# Patient Record
Sex: MALE | Race: BLACK OR AFRICAN AMERICAN | NOT HISPANIC OR LATINO | URBAN - METROPOLITAN AREA
[De-identification: names, ages, dates, MRNs, and addresses within clinical notes are randomized per-mention and may not be internally consistent; named-entity substitution may affect disease eponyms.]

---

## 2020-08-05 ENCOUNTER — EMERGENCY (EMERGENCY)
Facility: HOSPITAL | Age: 60
LOS: 1 days | Discharge: HOME | End: 2020-08-05
Attending: EMERGENCY MEDICINE | Admitting: EMERGENCY MEDICINE
Payer: COMMERCIAL

## 2020-08-05 VITALS
TEMPERATURE: 99 F | WEIGHT: 125 LBS | HEIGHT: 66 IN | OXYGEN SATURATION: 98 % | DIASTOLIC BLOOD PRESSURE: 78 MMHG | RESPIRATION RATE: 18 BRPM | SYSTOLIC BLOOD PRESSURE: 155 MMHG | HEART RATE: 110 BPM

## 2020-08-05 PROCEDURE — 99283 EMERGENCY DEPT VISIT LOW MDM: CPT

## 2020-08-05 PROCEDURE — 99282 EMERGENCY DEPT VISIT SF MDM: CPT
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
## 2020-08-05 NOTE — ED ADULT NURSE NOTE - NSIMPLEMENTINTERV_GEN_ALL_ED
Implemented All Fall Risk Interventions:  [place] to call system. Call bell, personal items and telephone within reach. Instruct patient to call for assistance. Room bathroom lighting operational. Non-slip footwear when patient is off stretcher. Physically safe environment: no spills, clutter or unnecessary equipment. Stretcher in lowest position, wheels locked, appropriate side rails in place. Provide visual cue, wrist band, yellow gown, etc. Monitor gait and stability. Monitor for mental status changes and reorient to person, place, and time. Review medications for side effects contributing to fall risk. Reinforce activity limits and safety measures with patient and family.

## 2020-08-05 NOTE — ED PROVIDER NOTE - PSYCHIATRIC, MLM
Alert and oriented to person, place, time/situation. no apparent risk to self or others, persevering possibility of COVID exposure

## 2020-08-05 NOTE — ED PROVIDER NOTE - NSFOLLOWUPINSTRUCTIONS_ED_ALL_ED_FT
Face Coverings (Masks) and COVID-19    WHAT YOU NEED TO KNOW:    What do I need to know about face coverings?A cloth face covering is recommended when you are in public, such as grocery stores, pharmacies, and on mass transit. The virus that causes COVID-19 can spread by people who have no symptoms. The covering helps reduce the chances of the virus spreading in droplets from talking, sneezing, and coughing. Homemade face coverings will save medical masks for healthcare workers and first responders. You will still need to continue social distancing and stay 6 feet away from others while you are out. Do not put a covering on anyone who is younger than 2 years, has breathing problems, or cannot remove it.    How can I make a face covering out of a thick cloth? You or someone you know can sew a face covering. You can use a bandana, a scarf, a carmen-shirt, or a hand towel. Choose a fabric that will keep its shape and size after it is washed and dried. You will also need 2 rubber bands or 2 hair ties or ponytail holders. The following is a way to make a face covering without sewing:    Wash and dry items to be used.    Check that you can breathe through the fabric.     Place the fabric on a flat surface.    Fold the top of the fabric down, toward the middle.    Fold the bottom of the fabric up, toward the middle.    Fold the top down again.    Fold the bottom up again.    Place rubber bands or holders over each side of the fabric.    Fold each side in, toward the middle.    Place the covering up to your nose and mouth.     Loop the rubber bands around your ears.     Adjust the fabric to cover your nose and mouth completely.    What do I need to know about face coverings?     Use a coffee filter in your face covering. Cut the bottom off a clean coffee filter. Fold the remaining part into the middle of your covering. It will provide an extra filter for you. Use a new coffee filter each time you use your covering.    Do not touch your face covering or eyes while you are out. The virus can live on surfaces. Wash your hands with soap and water for 20 seconds or use hand  before you remove your face covering.    Do not remove your face covering to talk, sneeze, or cough. Your face covering will help protect you and others around you.    Wash face coverings often. Wash them in a washing machine in the warmest water the fabric allows. Make sure the fabric is completely dry before using it. Only wear clean coverings when you go out.    Do not use a plastic face shield instead of a cloth covering. A face shield will not protect others from droplets. If a face shield must be used, choose one that wraps around both sides of the face and goes below the chin. Do not use disposable shields more than 1 time. Shields that can be used again need to be cleaned and disinfected between uses. Do not put a face shield or a cloth covering on a  or infant. These increase the risk for sudden infant death syndrome (SIDS).    What should I do if a face covering cannot be used some or all of the time?     Use a clear face covering if others need to read your lips. A clear covering may be helpful if you communicate with someone who is deaf or hard of hearing. A clear covering is made of cloth but has plastic over the mouth area. This will help the person see your lips more easily. Do not use a plastic face shield instead.    Talk to a healthcare provider if a covering cannot be worn. For example, a person who has a sensory or tactile disorder may have a bad reaction to wearing a cloth covering. Ask the provider what to do if a covering cannot be used. It will be highly important to keep at least 6 feet (2 meters) away from others if a covering cannot be worn.    Talk to your employer if you are concerned about wearing a covering at work. A face covering should not cause you to get too hot or have breathing problems. It should not be able to get caught in any machines you work with. If you work outdoors, it may be okay to leave the covering off when you are not near others. Keep your covering handy so you can put in on when you are around others. Follow all rules provided by your employer. You will need to wear a covering when you are in a group setting. Examples include a break room and meetings where social distancing is not possible. You will also need to wear a covering if you must work right next to others. If you are told to use a medical mask or respirator, do not wear a cloth face covering instead.    Help your child know when to wear a face covering. Your child may have a hard time wearing a face covering all day. Help your child think of times when he or she needs to wear it. Examples include riding in a school bus or waiting in lines. Your child may not need to wear a covering during outdoor recess or while at a park or playground. He or she will still need to stay at least 6 feet (2 meters) away from others. Your child's school, park, or playground may have rules for face coverings. Make sure your child knows and understands the rules. Remind him or her of the rules often.    Do not wear a face covering anywhere it can get wet. Examples include swimming pools, lakes, ponds, and rivers. You may not be able to breathe through a wet face covering. It is important to keep at least 6 feet (2 meters) between you and others in the water with you. Teach your child not to swim right next to others. This can be hard to do in swimming pools and places such as small ponds. It will be safest not to go into the water if distance from others is not easily possible.    Try to exercise outdoors or in a facility with good ventilation. You may not be able to wear a face covering while you exercise. You may not be able to breathe well through a covering if you are doing certain activities. If you are outdoors and not near others, it is okay to take your covering off. Keep it handy so you can put it on when you are near others. If you need to exercise indoors, it is best to find a building with good ventilation. You can keep the covering off while you have at least 6 feet (2 meters) between you and others.    Call your local emergency number (911 in the [place]) if:     You have trouble breathing or shortness of breath at rest.    You have chest pain or pressure that lasts longer than 5 minutes.    You become confused or hard to wake.    Your lips or face are blue.    You have a fever of 104°F (40°C) or higher.    When should I call my doctor? Do not go to your doctor's office or hospital. Call your doctor first if:     You had close physical contact within the last 14 days with someone who has a confirmed infection.    You have questions or concerns about your condition or care.    CARE AGREEMENT:    You have the right to help plan your care. Learn about your health condition and how it may be treated. Discuss treatment options with your healthcare providers to decide what care you want to receive. You always have the right to refuse treatment.
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
## 2020-08-05 NOTE — ED ADULT NURSE NOTE - CHPI ED NUR SYMPTOMS NEG
no vomiting/no tingling/no chills/no decreased eating/drinking/no nausea/no pain/no weakness/no fever/no dizziness
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
## 2020-08-05 NOTE — ED PROVIDER NOTE - PATIENT PORTAL LINK FT
You can access the FollowMyHealth Patient Portal offered by [facility] by registering at the following website: [email or web address]. By joining [facility]’s FollowMyHealth portal, you will also be able to view your health information using other applications (apps) compatible with our system.

## 2020-08-08 DIAGNOSIS — Z20.828 CONTACT WITH AND (SUSPECTED) EXPOSURE TO OTHER VIRAL COMMUNICABLE DISEASES: ICD-10-CM

## 2022-11-01 NOTE — ED ADULT NURSE NOTE - CAS TRG GEN SKIN CONDITION
Warm Complex Repair And Flap Additional Text (Will Appearing After The Standard Complex Repair Text): The complex repair was not sufficient to completely close the primary defect. The remaining additional defect was repaired with the flap mentioned below.